# Patient Record
Sex: MALE | URBAN - METROPOLITAN AREA
[De-identification: names, ages, dates, MRNs, and addresses within clinical notes are randomized per-mention and may not be internally consistent; named-entity substitution may affect disease eponyms.]

---

## 2021-03-19 ENCOUNTER — EMERGENCY (EMERGENCY)
Facility: HOSPITAL | Age: 36
LOS: 0 days | Discharge: HOME | End: 2021-03-19
Attending: EMERGENCY MEDICINE
Payer: SELF-PAY

## 2021-03-19 VITALS
OXYGEN SATURATION: 100 % | DIASTOLIC BLOOD PRESSURE: 66 MMHG | TEMPERATURE: 98 F | RESPIRATION RATE: 18 BRPM | HEART RATE: 66 BPM | SYSTOLIC BLOOD PRESSURE: 102 MMHG

## 2021-03-19 VITALS — HEIGHT: 63 IN | WEIGHT: 130.07 LBS

## 2021-03-19 DIAGNOSIS — R10.12 LEFT UPPER QUADRANT PAIN: ICD-10-CM

## 2021-03-19 DIAGNOSIS — R10.13 EPIGASTRIC PAIN: ICD-10-CM

## 2021-03-19 DIAGNOSIS — R11.0 NAUSEA: ICD-10-CM

## 2021-03-19 DIAGNOSIS — K29.00 ACUTE GASTRITIS WITHOUT BLEEDING: ICD-10-CM

## 2021-03-19 LAB
ALBUMIN SERPL ELPH-MCNC: 4.3 G/DL — SIGNIFICANT CHANGE UP (ref 3.3–5)
ALP SERPL-CCNC: 63 U/L — SIGNIFICANT CHANGE UP (ref 40–120)
ALT FLD-CCNC: 19 U/L — SIGNIFICANT CHANGE UP (ref 12–78)
ANION GAP SERPL CALC-SCNC: 5 MMOL/L — SIGNIFICANT CHANGE UP (ref 5–17)
AST SERPL-CCNC: 20 U/L — SIGNIFICANT CHANGE UP (ref 15–37)
BASOPHILS # BLD AUTO: 0.01 K/UL — SIGNIFICANT CHANGE UP (ref 0–0.2)
BASOPHILS NFR BLD AUTO: 0.1 % — SIGNIFICANT CHANGE UP (ref 0–2)
BILIRUB SERPL-MCNC: 0.5 MG/DL — SIGNIFICANT CHANGE UP (ref 0.2–1.2)
BUN SERPL-MCNC: 9 MG/DL — SIGNIFICANT CHANGE UP (ref 7–23)
CALCIUM SERPL-MCNC: 9.2 MG/DL — SIGNIFICANT CHANGE UP (ref 8.5–10.1)
CHLORIDE SERPL-SCNC: 108 MMOL/L — SIGNIFICANT CHANGE UP (ref 96–108)
CO2 SERPL-SCNC: 27 MMOL/L — SIGNIFICANT CHANGE UP (ref 22–31)
CREAT SERPL-MCNC: 0.9 MG/DL — SIGNIFICANT CHANGE UP (ref 0.5–1.3)
EOSINOPHIL # BLD AUTO: 0.01 K/UL — SIGNIFICANT CHANGE UP (ref 0–0.5)
EOSINOPHIL NFR BLD AUTO: 0.1 % — SIGNIFICANT CHANGE UP (ref 0–6)
GLUCOSE SERPL-MCNC: 90 MG/DL — SIGNIFICANT CHANGE UP (ref 70–99)
HCT VFR BLD CALC: 41 % — SIGNIFICANT CHANGE UP (ref 39–50)
HGB BLD-MCNC: 13.9 G/DL — SIGNIFICANT CHANGE UP (ref 13–17)
IMM GRANULOCYTES NFR BLD AUTO: 0.2 % — SIGNIFICANT CHANGE UP (ref 0–1.5)
LIDOCAIN IGE QN: 78 U/L — SIGNIFICANT CHANGE UP (ref 73–393)
LYMPHOCYTES # BLD AUTO: 1.75 K/UL — SIGNIFICANT CHANGE UP (ref 1–3.3)
LYMPHOCYTES # BLD AUTO: 16.1 % — SIGNIFICANT CHANGE UP (ref 13–44)
MCHC RBC-ENTMCNC: 30.5 PG — SIGNIFICANT CHANGE UP (ref 27–34)
MCHC RBC-ENTMCNC: 33.9 GM/DL — SIGNIFICANT CHANGE UP (ref 32–36)
MCV RBC AUTO: 89.9 FL — SIGNIFICANT CHANGE UP (ref 80–100)
MONOCYTES # BLD AUTO: 0.61 K/UL — SIGNIFICANT CHANGE UP (ref 0–0.9)
MONOCYTES NFR BLD AUTO: 5.6 % — SIGNIFICANT CHANGE UP (ref 2–14)
NEUTROPHILS # BLD AUTO: 8.46 K/UL — HIGH (ref 1.8–7.4)
NEUTROPHILS NFR BLD AUTO: 77.9 % — HIGH (ref 43–77)
PLATELET # BLD AUTO: 225 K/UL — SIGNIFICANT CHANGE UP (ref 150–400)
POTASSIUM SERPL-MCNC: 4 MMOL/L — SIGNIFICANT CHANGE UP (ref 3.5–5.3)
POTASSIUM SERPL-SCNC: 4 MMOL/L — SIGNIFICANT CHANGE UP (ref 3.5–5.3)
PROT SERPL-MCNC: 8.1 GM/DL — SIGNIFICANT CHANGE UP (ref 6–8.3)
RBC # BLD: 4.56 M/UL — SIGNIFICANT CHANGE UP (ref 4.2–5.8)
RBC # FLD: 12 % — SIGNIFICANT CHANGE UP (ref 10.3–14.5)
SODIUM SERPL-SCNC: 140 MMOL/L — SIGNIFICANT CHANGE UP (ref 135–145)
WBC # BLD: 10.86 K/UL — HIGH (ref 3.8–10.5)
WBC # FLD AUTO: 10.86 K/UL — HIGH (ref 3.8–10.5)

## 2021-03-19 PROCEDURE — 71045 X-RAY EXAM CHEST 1 VIEW: CPT

## 2021-03-19 PROCEDURE — 99285 EMERGENCY DEPT VISIT HI MDM: CPT

## 2021-03-19 PROCEDURE — 96374 THER/PROPH/DIAG INJ IV PUSH: CPT

## 2021-03-19 PROCEDURE — 93010 ELECTROCARDIOGRAM REPORT: CPT

## 2021-03-19 PROCEDURE — 76705 ECHO EXAM OF ABDOMEN: CPT

## 2021-03-19 PROCEDURE — 93005 ELECTROCARDIOGRAM TRACING: CPT

## 2021-03-19 PROCEDURE — 99284 EMERGENCY DEPT VISIT MOD MDM: CPT | Mod: 25

## 2021-03-19 PROCEDURE — 71045 X-RAY EXAM CHEST 1 VIEW: CPT | Mod: 26

## 2021-03-19 PROCEDURE — 80053 COMPREHEN METABOLIC PANEL: CPT

## 2021-03-19 PROCEDURE — 36415 COLL VENOUS BLD VENIPUNCTURE: CPT

## 2021-03-19 PROCEDURE — 96375 TX/PRO/DX INJ NEW DRUG ADDON: CPT

## 2021-03-19 PROCEDURE — 85025 COMPLETE CBC W/AUTO DIFF WBC: CPT

## 2021-03-19 PROCEDURE — 83690 ASSAY OF LIPASE: CPT

## 2021-03-19 PROCEDURE — 76705 ECHO EXAM OF ABDOMEN: CPT | Mod: 26

## 2021-03-19 RX ORDER — FAMOTIDINE 10 MG/ML
1 INJECTION INTRAVENOUS
Qty: 14 | Refills: 0
Start: 2021-03-19 | End: 2021-04-01

## 2021-03-19 RX ORDER — METOCLOPRAMIDE HCL 10 MG
10 TABLET ORAL ONCE
Refills: 0 | Status: COMPLETED | OUTPATIENT
Start: 2021-03-19 | End: 2021-03-19

## 2021-03-19 RX ORDER — MORPHINE SULFATE 50 MG/1
4 CAPSULE, EXTENDED RELEASE ORAL ONCE
Refills: 0 | Status: DISCONTINUED | OUTPATIENT
Start: 2021-03-19 | End: 2021-03-19

## 2021-03-19 RX ORDER — FAMOTIDINE 10 MG/ML
20 INJECTION INTRAVENOUS ONCE
Refills: 0 | Status: COMPLETED | OUTPATIENT
Start: 2021-03-19 | End: 2021-03-19

## 2021-03-19 RX ORDER — SODIUM CHLORIDE 9 MG/ML
1000 INJECTION INTRAMUSCULAR; INTRAVENOUS; SUBCUTANEOUS ONCE
Refills: 0 | Status: COMPLETED | OUTPATIENT
Start: 2021-03-19 | End: 2021-03-19

## 2021-03-19 RX ORDER — ONDANSETRON 8 MG/1
4 TABLET, FILM COATED ORAL ONCE
Refills: 0 | Status: COMPLETED | OUTPATIENT
Start: 2021-03-19 | End: 2021-03-19

## 2021-03-19 RX ORDER — METOCLOPRAMIDE HCL 10 MG
1 TABLET ORAL
Qty: 20 | Refills: 0
Start: 2021-03-19

## 2021-03-19 RX ADMIN — ONDANSETRON 4 MILLIGRAM(S): 8 TABLET, FILM COATED ORAL at 17:29

## 2021-03-19 RX ADMIN — Medication 10 MILLIGRAM(S): at 18:25

## 2021-03-19 RX ADMIN — Medication 30 MILLILITER(S): at 17:30

## 2021-03-19 RX ADMIN — SODIUM CHLORIDE 1000 MILLILITER(S): 9 INJECTION INTRAMUSCULAR; INTRAVENOUS; SUBCUTANEOUS at 17:29

## 2021-03-19 RX ADMIN — FAMOTIDINE 20 MILLIGRAM(S): 10 INJECTION INTRAVENOUS at 17:30

## 2021-03-19 RX ADMIN — MORPHINE SULFATE 4 MILLIGRAM(S): 50 CAPSULE, EXTENDED RELEASE ORAL at 17:29

## 2021-03-19 RX ADMIN — SODIUM CHLORIDE 1000 MILLILITER(S): 9 INJECTION INTRAMUSCULAR; INTRAVENOUS; SUBCUTANEOUS at 18:36

## 2021-03-19 NOTE — ED STATDOCS - NS_ ATTENDINGSCRIBEDETAILS _ED_A_ED_FT
I, Devin Castro MD,  performed the initial face to face bedside interview with this patient regarding history of present illness, review of symptoms and relevant past medical, social and family history.  I completed an independent physical examination.  I was the initial provider who evaluated this patient.  The history, relevant review of systems, past medical and surgical history, medical decision making, and physical examination was documented by the scribe in my presence and I attest to the accuracy of the documentation.

## 2021-03-19 NOTE — ED STATDOCS - OBJECTIVE STATEMENT
36 y/o with no pertinent PMHx presents to the ED c/o epigastric pain starting last night radiated to back. Associated nausea, no vomiting. Pain is severe and constant . No fevers, chest pain, cough, SOB. Pt didn't take anything for sx. Pt does not drink alcohol.   : [id number]

## 2021-03-19 NOTE — ED STATDOCS - NSFOLLOWUPCLINICS_GEN_ALL_ED_FT
[facility]  Family Medicine  [address] [place] [address]  Phone: [phone]  Fax:   Follow Up Time:

## 2021-03-19 NOTE — ED STATDOCS - PROGRESS NOTE DETAILS
History with assistance from  ID: [id number]. 34 y/o M with no PMH presents with epigastric pain radiating to the back. States he had similar pain 1 year ago which subsided on it's own. Pain started at apx 3AM today. Attempted to take "gas pills" for symptoms with no change. States he has not consumed alcohol recently, new medications or spicy foods recently. Denies fever, chills, nausea, vomiting. PE: Uncomfortable appearing. Cardiac: s1s2, RRR. Lungs: CTAB. Abdomen: NBS x4, soft, upper abdominal TTP. No CVAT. A/P: concern for pancreatitis, cholecystitis, peptic ulcer. Will assess labs, RUQ US, provide GI cocktail. Reassess. - Manish Chambers PA-C Reassessed with assistance from  ID# [id number]. Pt states pain unchanged, describes pain as "heartburn". made aware of lab and US findings. Advised will likely dc home with GI cocktail and follow up with GI. - Manish Chambers PA-C Pt notes some improvement following reglan. Will dc home after PO trial. - Manish Chambers PA-C

## 2021-03-19 NOTE — ED STATDOCS - PATIENT PORTAL LINK FT
You can access the FollowMyHealth Patient Portal offered by [facility] by registering at the following website: [email or web address]. By joining [facility]’s FollowMyHealth portal, you will also be able to view your health information using other applications (apps) compatible with our system.

## 2021-03-19 NOTE — ED STATDOCS - NSFOLLOWUPINSTRUCTIONS_ED_ALL_ED_FT
TAKE MEDICATIONS AS PRESCRIBED. FOLLOW UP WITH GASTROENTEROLOGY (GI) AS ADVISED.    TOME LOS MEDICAMENTOS SEGÚN LO PRESCRITO. SEGUIMIENTO CON GASTROENTEROLOGÍA SEGÚN LO RECOMENDADO.    Gastritis    LO QUE NECESITA SABER:    ¿Qué es la gastritis?La gastritis es margo inflamación o irritación del revestimiento del estómago.     Órganos abdominales         ¿Qué aumenta mi riesgo de presentar gastritis?  •Infección provocada por bacterias, virus o parásitos      •Analgésicos antiinflamatorios no esteroides, aspirina o medicamentos esteroides      •Uso de productos con tabaco o alcohol      •Traumatismo, ray margo lesión en el estómago o el intestino      •Enfermedades autoinmunes, ray diabetes, enfermedad de la tiroides o enfermedad de Crohn      •Estrés      •Ser mayor de 60 años      •Consumo de drogas ilegales, ray la cocaína      ¿Cuáles son los signos y síntomas de la gastritis?  •Dolor de estómago, ardor o dolor con la palpación      •Sensación de llenura y opresión      •Náuseas o vómitos      •Falta de apetito o rápidamente se siente lleno mientras está comiendo      •Mal aliento      •Fatiga o más cansancio que de costumbre      •Acidez estomacal      ¿Cómo se diagnostica la gastritis?[place] médico le preguntará sobre ammy signos y síntomas y lo examinará. Es posible que usted necesite alguno de los siguientes:   •Los análisis de sangrepueden utilizarse para detectar margo infección, deshidratación o anemia (niveles bajos de glóbulos rojos).      •Margo muestra de materia fecalse puede analizar para detectar la presencia de marcos o gérmenes que podrían estar causando la gastritis.      •Margo prueba del alientopodría mostrar si el H pylori es el causante de de la rosa gastritis. A usted le darán un líquido para geneva. Luego usted respira dentro de margo bolsa. [place] médico medirá la cantidad de dióxido de carbono en de la rosa aliento. La cantidad extra de dióxido de carbono puede significar que usted tiene margo infección por H pylori.      •Margo endoscopiapuede utilizarse para buscar irritación o sangrado en el estómago. [place] médico usará un endoscopio (tubo con margo jana y cámara en el extremo) andrea el procedimiento. Se podría geneva margo muestra del estómago para examinarlo.      ¿Cuál es el tratamiento para la gastritis?Ammy síntomas podrían desaparecer sin tratamiento. El tratamiento dependerá de lo que le está causando de la rosa gastritis. [place] médico le podría recomendar cambios a los medicamentos que omar. Los medicamentos podrían ser recetados para ayudar a tratar la infección bacteriana o para disminuir el ácido estomacal.    ¿Cómo puedo controlar o evitar la gastritis?  •No fume.La nicotina y otras sustancias químicas de los cigarrillos y los cigarros pueden empeorar ammy síntomas y causar daño pulmonar. Pida información a de la rosa médico si usted actualmente fuma y necesita ayuda para dejar de fumar. Los cigarrillos electrónicos o el tabaco sin humo igualmente contienen nicotina. Consulte con de la rosa médico antes de utilizar estos productos.      •No consuma alcohol.El alcohol puede evitar la cicatrización y empeorar la gastritis. Consulte con de la rosa médico si usted necesita ayuda para dejar de geneva alcohol.      •No tome medicamentos HOLLY o aspirina a menos que así se lo indiquen.Estos y otros medicamentos similares pueden causar irritación en el revestimiento del estómago. Si de la rosa médico lo autoriza a geneva medicamentos HOLLY, tómelos con la comida.      •No coma alimentos que le provocan irritación:Los alimentos ray las naranjas y la salsa pueden causar ardor o dolor. Consuma alimentos saludables y variados. Unos ejemplos incluyen las frutas (no las cítricas), verduras, productos lácteos descremados, legumbres, pan integral al igual que las vanessa magras y pescado. Trate de comer porciones más pequeñas y geneva agua con ammy comidas. No coma nada al menos por 3 horas antes de acostarse.      •Encuentre maneras de relajarse y reducir el estrés.El estrés puede aumentar el ácido estomacal y empeorar la gastritis. Las actividades ray el yoga, la meditación o el escuchar música pueden ayudarlo a relajarse. Pase tiempo con amigos, o samantha cosas que disfruta.      Llame al 911 en stacie de presentar lo siguiente:  •Tiene dolor de pecho o le falta el aire.          ¿Cuándo ck buscar atención inmediata?  •Usted vomita marcos.      •Usted tiene evacuaciones intestinales negras o con marcos.      •Usted tiene un ronda dolor de estómago o de espalda.      ¿Cuándo ck comunicarme con mi médico?  •Tiene fiebre.      •Usted tiene síntomas nuevos o estos empeoran, aun después del tratamiento.      •Usted tiene preguntas o inquietudes acerca de de la rosa condición o cuidado.      ACUERDOS SOBRE DE LA ROSA CUIDADO:    Usted tiene el derecho de ayudar a planear de la rosa cuidado. Aprenda todo lo que pueda sobre de la rosa condición y ray darle tratamiento. Discuta ammy opciones de tratamiento con ammy médicos para decidir el cuidado que usted desea recibir. Usted siempre tiene el derecho de rechazar el tratamiento.       © Copyright [employer] 2021

## 2021-03-19 NOTE — ED STATDOCS - CLINICAL SUMMARY MEDICAL DECISION MAKING FREE TEXT BOX
34 y/o male presents with severe epigastric pain. Concerned for pancreatitis vs gastritis.  Will obtain labs, US, and reassess. 34 y/o male presents with severe epigastric pain. Concerned for pancreatitis vs gastritis. no signs of appendicitis or cholecystitis Will obtain labs, US, and reassess.

## 2021-03-19 NOTE — ED ADULT TRIAGE NOTE - CHIEF COMPLAINT QUOTE
Patient presents to ED complaining of abdominal pain starting at approx. 3am this morning. Pt states pain radiates to back. No medication taken PTA. Denies chest pain, nausea/vomiting.

## 2021-03-19 NOTE — ED STATDOCS - NS ED ROS FT
Constitutional: No fever or chills  Eyes: No visual changes  HEENT: No throat pain  CV: No chest pain  Resp: No SOB no cough  GI: +abd pain, +nausea, no vomiting  : No dysuria  MSK: No musculoskeletal pain  Skin: No rash  Neuro: No headache

## 2021-03-19 NOTE — ED STATDOCS - PHYSICAL EXAMINATION
Constitutional: NAD AAOx3  Eyes: PERRLA EOMI  Head: Normocephalic atraumatic  Mouth: MMM  Cardiac: regular rate   Resp: Lungs CTAB  GI: Abd s/nd, epigastric TTP, LUQ TTP, negative Pizano's and negative McBurney's, no CVA tenderness.   Neuro: CN2-12 intact  Skin: No rashes Constitutional: mild distress AAOx3  Eyes: PERRLA EOMI  Head: Normocephalic atraumatic  Mouth: MMM  Cardiac: regular rate   Resp: Lungs CTAB  GI: Abd s/nd, epigastric TTP, LUQ TTP, negative Pizano's and negative McBurney's, no CVA tenderness.   Neuro: CN2-12 intact  Skin: No rashes